# Patient Record
(demographics unavailable — no encounter records)

---

## 2019-01-07 NOTE — REP
CT Head without contrast

 

HISTORY:  Syncope

 

COMPARISON: None

 

There is no intraparenchymal hemorrhage, acute infarct,  mass or midline shift.

The ventricular system is normal in appearance.  There is no extra cerebral

collection.  There is no fracture. No mucosal thickening is present in the

ethmoid and left sphenoid sinuses.

 

IMPRESSION:  There is no intracranial lesion.

 

 

 

 

Electronically Signed by

Triston Gonzales MD 01/07/2019 09:52 A

## 2019-01-07 NOTE — REP
Chest two views

 

HISTORY: Syncope

 

Comparison: None

 

The lungs are clear.  The heart is normal in size.  The pulmonary vasculature is

normal in appearance.  The bony structure is intact.

 

IMPRESSION:  No acute disease.

 

 

Electronically Signed by

Triston Gonzales MD 01/07/2019 09:53 A

## 2019-01-07 NOTE — ECGEPIP
Stationary ECG Study

                           [facility] - ED

                                       

                                       Test Date:    2019

Pat Name:     SEGUNDO BOLDEN              Department:   

Patient ID:   [id number]                 Room:         -

Gender:       M                        Technician:   sk

:          1999               Requested By: ALEXANDER BAJWA PA-C

Order Number: [id number]     Reading MD:   Saray Martinez

                                 Measurements

Intervals                              Axis          

Rate:         60                       P:            10

MT:           126                      QRS:          71

QRSD:         109                      T:            52

QT:           393                                    

QTc:          393                                    

                           Interpretive Statements

SINUS RHYTHM

PROBABLE EARLY REPOLARIZATION, CLINICAL CORRELATION

NO PRIOR FOR COMPARISON

Electronically Signed On 2019 13:48:09 EST by Saray Martinez